# Patient Record
Sex: FEMALE
[De-identification: names, ages, dates, MRNs, and addresses within clinical notes are randomized per-mention and may not be internally consistent; named-entity substitution may affect disease eponyms.]

---

## 2017-09-25 VITALS — RESPIRATION RATE: 18 BRPM

## 2017-10-03 ENCOUNTER — HOSPITAL ENCOUNTER (OUTPATIENT)
Dept: HOSPITAL 14 - H.OPSURG | Age: 77
Discharge: HOME | End: 2017-10-03
Attending: OPHTHALMOLOGY
Payer: MEDICARE

## 2017-10-03 VITALS
HEART RATE: 61 BPM | TEMPERATURE: 97.7 F | SYSTOLIC BLOOD PRESSURE: 120 MMHG | DIASTOLIC BLOOD PRESSURE: 59 MMHG | OXYGEN SATURATION: 95 %

## 2017-10-03 VITALS — BODY MASS INDEX: 27.7 KG/M2

## 2017-10-03 DIAGNOSIS — I10: ICD-10-CM

## 2017-10-03 DIAGNOSIS — H25.041: Primary | ICD-10-CM

## 2017-10-03 DIAGNOSIS — E11.9: ICD-10-CM

## 2017-10-03 DIAGNOSIS — E03.9: ICD-10-CM

## 2017-10-03 PROCEDURE — 66984 XCAPSL CTRC RMVL W/O ECP: CPT

## 2017-10-03 PROCEDURE — 82948 REAGENT STRIP/BLOOD GLUCOSE: CPT

## 2017-10-03 RX ADMIN — SODIUM CHONDROITIN SULFATE / SODIUM HYALURONATE ONE KIT: 0.55-0.5 INJECTION INTRAOCULAR at 09:58

## 2017-10-03 RX ADMIN — SODIUM CHONDROITIN SULFATE / SODIUM HYALURONATE ONE KIT: 0.55-0.5 INJECTION INTRAOCULAR at 10:00

## 2017-10-04 NOTE — OP
PROCEDURE DATE: 10/03/2017



SURGEON: RENEE MULLEN MD



ANESTHESIOLOGIST: JESSICA ROMANO DO



ANESTHESIA: LOCAL / IV SEDATION 



PREOPERATIVE DIAGNOSIS:   CATARACT RIGHT EYE.



POSTOPERATIVE DIAGNOSIS:   CATARACT RIGHT  EYE.



OPERATION:  CLEAR CORNEAL PHACOEMULSIFICATION WITH LENS IMPLANT RIGHT EYE.



PREPARATION AND PROCEDURE:   After the patient was prepped and draped in the 
usual manner for sterile ophthalmic surgery, local IV sedation was administered
; eye seals were applied to the upper and lower lid margins and an adult wire 
lid speculum was placed within the lids. Under microsurgical control, a two-
step clear corneal incision was made into the anterior chamber. The initial 
incision was perpendicular to the corneal plane. The second 

incision with the keratome was placed at a 45-degree angle to the first 
incision. One cc of one percent Xylocaine MPF was instilled into the anterior 
chamber to achieve proper intraocular anesthesia.  At this time, the 
Viscoelastic was injected into the anterior chamber for protection of the 
endothelium and for maintenance of the chamber depth. A 360-degree continuous 
curvilinear capsulorrhexis was performed using a pre-bent 25-gauge needle. 
Hydrodissection and hydrodelineation were performed using a Walls cannula and 
balanced salt solution. Utilizing the tip of the Walls cannula, the nucleus 
was rotated freely within the capsular bag. A standard one-handed 
phacoemulsification was utilized at this time for sculpting and rotating of the 
nucleus. The nucleus was fragmented in its entirety and aspirated without any 
consequence.  A standard I&A was carried out for the residual cortical 
material. No residual material was noted within the capsular bag. The posterior 
capsule was noted to be clear. Additional Viscoelastic was injected into the 
capsular bag in preparation for lens implantation. After this has been 
satisfactorily achieved the intraocular lens injected through the corneal 
incision into the capsular bag. The intraocular lens was manipulated until it 
was properly oriented and the Viscoelastic 

was evacuated from the capsular bag and anterior chamber. The anterior chamber 
was reformed with balanced salt solution. The corneal incision was irrigated 
with BSS. The intraocular pressure was found to be within normal limits. This 
terminated the procedure. The speculum and lid drapes were removed. TobraDex 
ophthalmic suspension and Pilocarpine 1% drops one drop was applied to the eye.
  



POSTOPERATIVE CONDITION: The patient was brought to the Postanesthesia Recovery 
area with stable vital signs.





_____________________

DRENEE COLLINS MD

## 2017-10-17 ENCOUNTER — HOSPITAL ENCOUNTER (OUTPATIENT)
Dept: HOSPITAL 14 - H.OPSURG | Age: 77
Discharge: HOME | End: 2017-10-17
Attending: OPHTHALMOLOGY
Payer: MEDICARE

## 2017-10-17 VITALS — OXYGEN SATURATION: 97 %

## 2017-10-17 VITALS — TEMPERATURE: 98.2 F | HEART RATE: 75 BPM | DIASTOLIC BLOOD PRESSURE: 70 MMHG | SYSTOLIC BLOOD PRESSURE: 142 MMHG

## 2017-10-17 VITALS — RESPIRATION RATE: 18 BRPM

## 2017-10-17 VITALS — BODY MASS INDEX: 27.7 KG/M2

## 2017-10-17 DIAGNOSIS — H26.9: Primary | ICD-10-CM

## 2017-10-17 PROCEDURE — 66984 XCAPSL CTRC RMVL W/O ECP: CPT

## 2017-10-17 PROCEDURE — 82948 REAGENT STRIP/BLOOD GLUCOSE: CPT

## 2017-10-17 RX ADMIN — PILOCARPINE HYDROCHLORIDE ONE DROP: 10 SOLUTION/ DROPS OPHTHALMIC at 10:13

## 2017-10-17 RX ADMIN — TETRACAINE HYDROCHLORIDE ONE DROP: 5 SOLUTION OPHTHALMIC at 10:04

## 2017-10-17 RX ADMIN — ACETYLCHOLINE CHLORIDE ONE EA: KIT at 10:07

## 2017-10-17 RX ADMIN — NEOMYCIN SULFATE, POLYMYXIN B SULFATE AND DEXAMETHASONE ONE DROP: 3.5; 10000; 1 SUSPENSION/ DROPS OPHTHALMIC at 10:14

## 2017-10-17 RX ADMIN — TETRACAINE HYDROCHLORIDE ONE DROP: 5 SOLUTION OPHTHALMIC at 10:26

## 2017-10-17 RX ADMIN — SODIUM CHONDROITIN SULFATE / SODIUM HYALURONATE ONE KIT: 0.55-0.5 INJECTION INTRAOCULAR at 10:40

## 2017-10-17 RX ADMIN — PILOCARPINE HYDROCHLORIDE ONE DROP: 10 SOLUTION/ DROPS OPHTHALMIC at 10:50

## 2017-10-17 RX ADMIN — SODIUM CHONDROITIN SULFATE / SODIUM HYALURONATE ONE KIT: 0.55-0.5 INJECTION INTRAOCULAR at 10:07

## 2017-10-17 RX ADMIN — NEOMYCIN SULFATE, POLYMYXIN B SULFATE AND DEXAMETHASONE ONE DROP: 3.5; 10000; 1 SUSPENSION/ DROPS OPHTHALMIC at 10:50

## 2017-10-17 RX ADMIN — LIDOCAINE HYDROCHLORIDE ONE MG: 10 INJECTION, SOLUTION EPIDURAL; INFILTRATION; INTRACAUDAL; PERINEURAL at 10:39

## 2017-10-17 RX ADMIN — ACETYLCHOLINE CHLORIDE ONE EA: KIT at 10:45

## 2017-10-17 RX ADMIN — LIDOCAINE HYDROCHLORIDE ONE MG: 10 INJECTION, SOLUTION EPIDURAL; INFILTRATION; INTRACAUDAL; PERINEURAL at 10:05

## 2018-03-19 ENCOUNTER — HOSPITAL ENCOUNTER (EMERGENCY)
Dept: HOSPITAL 14 - H.ER | Age: 78
Discharge: HOME | End: 2018-03-19
Payer: MEDICARE

## 2018-03-19 VITALS — HEART RATE: 78 BPM | TEMPERATURE: 97 F | RESPIRATION RATE: 18 BRPM | OXYGEN SATURATION: 97 %

## 2018-03-19 VITALS — DIASTOLIC BLOOD PRESSURE: 72 MMHG | SYSTOLIC BLOOD PRESSURE: 161 MMHG

## 2018-03-19 VITALS — BODY MASS INDEX: 27.7 KG/M2
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
## 2018-03-19 DIAGNOSIS — Z85.850: ICD-10-CM

## 2018-03-19 DIAGNOSIS — I10: ICD-10-CM

## 2018-03-19 DIAGNOSIS — Z79.84: ICD-10-CM

## 2018-03-19 DIAGNOSIS — S89.91XA: ICD-10-CM

## 2018-03-19 DIAGNOSIS — E78.00: ICD-10-CM

## 2018-03-19 DIAGNOSIS — E03.9: ICD-10-CM
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
## 2018-03-19 DIAGNOSIS — S70.01XA: Primary | ICD-10-CM

## 2018-03-19 NOTE — RAD
PROCEDURE:  Pelvis right hip 03/19/2018



HISTORY:

Hip injury 



COMPARISON:

Comparison made with CT scan abdomen pelvis dated the 04/15/2013 

which image the pelvis and both hips in 3 planes.



TECHNIQUE:

Frontal view of the pelvis and frontal/frogleg lateral views right 

hip performed



FINDINGS:

No evidence of acute displaced fracture nor dislocation.  The osseous 

structures appear intact.  Mild degenerative changes both hip joints.



Mild sclerosis inferior margin both SI joints. 



IMPRESSION:

No definitive evidence of acute displaced fracture nor dislocation. 

DJD both hip joints.  If symptoms persist or occult fracture 

suspected clinically recommend followup CT scan or MRI.

## 2018-03-19 NOTE — RAD
PROCEDURE:  Right knee dated 03/19/2018 



HISTORY:

knee injury



COMPARISON:

Correlation made with prior radiographs left knee 07/21/2014



FINDINGS:



BONES:

No evidence of acute displaced fracture nor dislocation. The osseous 

structures appear intact. 



JOINTS:

Joint spaces relatively preserved. Tiny posterior patellar osteophyte 

formation. Knee note is made of a enthesophyte arising from the 

tibial tubercle 



JOINT EFFUSION:

Suspect small suprapatellar joint effusion could 



OTHER FINDINGS:

None.



IMPRESSION:

No acute displaced fracture nor dislocation.  Minimal DJD. Suspect 

small suprapatellar joint effusion.

## 2018-03-19 NOTE — US
PROCEDURE:  Right lower extremity venous duplex Doppler. 



HISTORY:

evaluate for dvt







COMPARISON:

None available.



TECHNIQUE:

Common femoral, superficial femoral, popliteal and posterior tibial 

veins were evaluated. Flow was assessed with color Doppler, 

compressibility, assessment of phasic flow and augmentation response. 



FINDINGS:



COMMON FEMORAL VEIN:

Unremarkable.



SUPERFICIAL FEMORAL VEIN:

Unremarkable.



POPLITEAL VEIN:

Unremarkable.



POSTERIOR TIBIAL VEIN:

Unremarkable.



OTHER FINDINGS:

None.



IMPRESSION:

No evidence of deep venous thrombosis in the right lower extremity.

## 2018-03-19 NOTE — ED PDOC
Lower Extremity Pain/Injury


Time Seen by Provider: 03/19/18 14:42


Chief Complaint (Nursing): Lower Extremity Problem/Injury


Chief Complaint (Provider): Lower Extremity Problem/Injury


History Per: Patient


History/Exam Limitations: no limitations


Onset/Duration Of Symptoms: Days (x3)


Current Symptoms Are (Timing): Still Present


Additional Complaint(s): 





77 year old female with medical history of hypertension, arthritis and thyroid 

cancer, presents to the emergency department with a complaint of right-sided 

hip and knee pain after a ladder had fallen on her 3 days ago at a [employer]. She 

denied any pain at the time and was able to ambulate but noticed swelling and 

pain today. Patient stated she took Motrin earlier today with some relief.





PMD: Satnam Marte MD





Past Medical History


Reviewed: Historical Data, Nursing Documentation, Vital Signs


Vital Signs: 





 Last Vital Signs











Temp  97 F L  03/19/18 13:36


 


Pulse  78   03/19/18 13:36


 


Resp  18   03/19/18 13:36


 


BP  161/72 H  03/19/18 13:36


 


Pulse Ox  97   03/19/18 13:36














- Medical History


PMH: Arthritis, HTN, Hypercholesterolemia, Hypothyroidism, Malignancy (Thyroid 

Ca s/p surgical resection)


   Denies: Fractures, Chronic Kidney Disease





- Surgical History


Surgical History: 


   Denies: No Surg Hx





- Family History


Family History: States: Unknown Family Hx





- Social History


Current smoker - smoking cessation education provided: No


Ex-Smoker (has not smoked in the last 12 months): Yes


Alcohol: None


Drugs: Denies





- Home Medications


Home Medications: 


 Ambulatory Orders











 Medication  Instructions  Recorded


 


Amlodipine Besylate/Benazepril 1 cap PO DAILY 10/17/17





[Amlodipine-Benazepril 5-10 mg]  


 


Ergocalciferol (Vitamin D2) 1.25 mg PO DAILY 10/17/17





[Vitamin D2]  


 


Fenofibrate [Triglide] 160 mg PO DAILY 10/17/17


 


Levothyroxine [Synthroid] 125 mcg PO DAILY 10/17/17


 


metFORMIN [glucOPHAGE] 500 mg PO DAILY 10/17/17














- Allergies


Allergies/Adverse Reactions: 


 Allergies











Allergy/AdvReac Type Severity Reaction Status Date / Time


 


No Known Allergies Allergy   Verified 03/19/18 13:36














Review of Systems


ROS Statement: Except As Marked, All Systems Reviewed And Found Negative


Genitourinary Female: Positive for: Pelvic Pain (right hip)


Musculoskeletal: Positive for: Leg Pain (right knee with swelling)





Physical Exam





- Reviewed


Nursing Documentation Reviewed: Yes


Vital Signs Reviewed: Yes





- Physical Exam


Appears: Positive for: Non-toxic, No Acute Distress


Head Exam: Positive for: ATRAUMATIC, NORMAL INSPECTION, NORMOCEPHALIC


Pulses-Dorsalis Pedis (L): 2+


Pulses-Dorsalis Pedis (R): 2+


Pulses-Post. Tibialis (L): 2+


Pulses-Post. Tibialis (R): 2+


Extremity: Positive for: Normal ROM, Tenderness (anterior right knee mildly and 

gluteal region), Swelling (right posterior aspect of popliteal fossa).  

Negative for: Deformity (lower)


Neurologic/Psych: Positive for: Alert (X3), Oriented.  Negative for: Motor/

Sensory Deficits





- ECG


O2 Sat by Pulse Oximetry: 97 (RA)


Pulse Ox Interpretation: Normal





Medical Decision Making


Medical Decision Making: 





Initial Impression: Hip pain and knee pain S/P injury





Initial Plan:


* Xray knee (right)


* Xray hip with pelvis


* US duplex LE


________________________________________________________________________________

__________________________





Time: 1646


--Xray knee (right)


FINDINGS:


BONES:


No evidence of acute displaced fracture nor dislocation. The osseous structures 

appear intact. 


JOINTS:


Joint spaces relatively preserved. Tiny posterior patellar osteophyte 

formation. Knee note is made of a enthesophyte arising from the tibial tubercle 


JOINT EFFUSION:


Suspect small suprapatellar joint effusion could 


OTHER FINDINGS:


None.





IMPRESSION:


No acute displaced fracture nor dislocation.  Minimal DJD. Suspect small 

suprapatellar joint effusion.


_________________





Time: 1649


--Xray hip/pelvis


FINDINGS:


No evidence of acute displaced fracture nor dislocation.  The osseous 

structures appear intact.  Mild degenerative changes both hip joints.


Mild sclerosis inferior margin both SI joints. 





IMPRESSION:


No definitive evidence of acute displaced fracture nor dislocation. DJD both 

hip joints.  If symptoms persist or occult fracture suspected clinically 

recommend followup CT scan or MRI.


_________________





Time: 1707


--US duplex LE


FINDINGS:


COMMON FEMORAL VEIN:


Unremarkable.


SUPERFICIAL FEMORAL VEIN:


Unremarkable.


POPLITEAL VEIN:


Unremarkable.


POSTERIOR TIBIAL VEIN:


Unremarkable.


OTHER FINDINGS:


None.





IMPRESSION:


No evidence of deep venous thrombosis in the right lower extremity.





--------------------------------------------------------------------------------

----------------------------------------


Scribe Attestation:


Documented by Rianna Hernandez, acting as a scribe for Drew Castillo PA-C.





Provider Scribe Attestation:


All medical record entries made by the Scribe were at my direction and 

personally dictated by me. I have reviewed the chart and agree that the record 

accurately reflects my personal performance of the history, physical exam, 

medical decision making, and the department course for this patient. I have 

also personally directed, reviewed, and agree with the discharge instructions 

and disposition.





Disposition





- Clinical Impression


Clinical Impression: 


 Knee injury, Contusion, hip








- Patient ED Disposition


Is Patient to be Admitted: No





- Disposition


Referrals: 


Todd Flynn III, MD [Staff Provider] - 


Disposition: Routine/Home


Disposition Time: 17:50


Condition: FAIR


Instructions:  Contusion (DC), Knee Sprain (DC), Hip Pain in Older People, Hip 

Pain


Forms:  [email or web address] (English)


Print Language: Mozambican

## 2018-06-19 ENCOUNTER — HOSPITAL ENCOUNTER (OUTPATIENT)
Dept: HOSPITAL 31 - C.SDS | Age: 78
Discharge: HOME | End: 2018-06-19
Attending: SURGERY
Payer: MEDICARE

## 2018-06-19 VITALS — SYSTOLIC BLOOD PRESSURE: 111 MMHG | DIASTOLIC BLOOD PRESSURE: 55 MMHG | HEART RATE: 66 BPM

## 2018-06-19 VITALS — RESPIRATION RATE: 18 BRPM | OXYGEN SATURATION: 98 % | TEMPERATURE: 97 F

## 2018-06-19 VITALS — BODY MASS INDEX: 30.8 KG/M2

## 2018-06-19 DIAGNOSIS — L72.3: Primary | ICD-10-CM

## 2018-06-19 PROCEDURE — 25075 EXC FOREARM LES SC < 3 CM: CPT

## 2018-06-19 PROCEDURE — 82948 REAGENT STRIP/BLOOD GLUCOSE: CPT

## 2018-06-19 PROCEDURE — 88304 TISSUE EXAM BY PATHOLOGIST: CPT

## 2018-06-19 NOTE — PCM.SURG1
Surgeon's Initial Post Op Note





- Surgeon's Notes


Surgeon: Dr. Rivera


Assistant: Oliva Solis PGy2


Pre-Operative Diagnosis: L arm cyst


Operative Findings: L arm cyst [id number]


Post-Operative Diagnosis: SAme


Operation Performed: Excision of L arm cyst


Specimen/Specimens Removed: L arm cyst [id number]


Estimated Blood Loss: EBL {In ML}: 5


Blood Products Given: N/A


Drains Used: No Drains


Post-Op Condition: Good


Date of Surgery/Procedure: 06/19/18


Time of Surgery/Procedure: 11:51

## 2018-06-22 NOTE — OP
PROCEDURE DATE:  06/19/2018



PREOPERATIVE DIAGNOSIS:  Cyst, left forearm.



POSTOPERATIVE DIAGNOSIS:  Cyst, left forearm.



PROCEDURE:  Excision of soft tissue mass, left forearm.



SURGEON:  Dr. Samira Rivera.



ASSISTANT:  Dr. Solis.



ANESTHESIA:  Local with IV sedation.



ANESTHESIOLOGIST:  Conchita Vidal CRNA.



DESCRIPTION OF OPERATION:  With the patient in the supine position having

received IV sedation, the left arm was positioned across the patient's body

to access a dorsal forearm lesion.  The left forearm was prepped and draped

in the usual sterile manner.  There was noted to be a 1 cm subcutaneous

firm mass at several inches below the elbow, and the skin surrounding this

was infiltrated with 1% lidocaine.  An elliptical incision was made in an

axial direction and the mass which appeared to have the gross appearance of

an inspissated inclusion cyst or a _____ cyst was sharply dissected from

the surrounding subcutaneous tissue.  The operative site was examined for

hemostasis and closure was closed with subcuticular sutures of 4-0 Monocryl

and Steri-Strips.  A dry sterile dressing was applied.  The patient

tolerated the procedure well and transferred back to day stay in stable

condition.  Estimated blood loss for the procedure was 5 mL.







__________________________________________

Samira Rivera MD





DD:  06/21/2018 15:11:16

DT:  06/21/2018 22:33:40

Job # [id number]

## 2019-04-27 ENCOUNTER — HOSPITAL ENCOUNTER (EMERGENCY)
Dept: HOSPITAL 14 - H.ER | Age: 79
LOS: 1 days | Discharge: HOME | End: 2019-04-28
Payer: MEDICARE

## 2019-04-27 VITALS — BODY MASS INDEX: 26.9 KG/M2

## 2019-04-27 DIAGNOSIS — Z85.850: ICD-10-CM

## 2019-04-27 DIAGNOSIS — Z79.84: ICD-10-CM

## 2019-04-27 DIAGNOSIS — Z98.890: ICD-10-CM

## 2019-04-27 DIAGNOSIS — R09.89: Primary | ICD-10-CM

## 2019-04-27 DIAGNOSIS — J06.9: ICD-10-CM

## 2019-04-27 DIAGNOSIS — J44.9: ICD-10-CM

## 2019-04-27 DIAGNOSIS — M81.0: ICD-10-CM

## 2019-04-27 DIAGNOSIS — J30.2: ICD-10-CM

## 2019-04-27 DIAGNOSIS — I10: ICD-10-CM

## 2019-04-27 DIAGNOSIS — Z79.899: ICD-10-CM

## 2019-04-27 DIAGNOSIS — E03.9: ICD-10-CM

## 2019-04-27 LAB
ALBUMIN SERPL-MCNC: 4.1 G/DL (ref 3.5–5)
ALBUMIN/GLOB SERPL: 1 {RATIO} (ref 1–2.1)
ALT SERPL-CCNC: 19 U/L (ref 9–52)
APTT BLD: 31.2 SECONDS (ref 25.6–37.1)
AST SERPL-CCNC: 26 U/L (ref 14–36)
BASE EXCESS BLDV CALC-SCNC: 3.6 MMOL/L (ref 0–2)
BASOPHILS # BLD AUTO: 0 K/UL (ref 0–0.2)
BASOPHILS NFR BLD: 0.8 % (ref 0–2)
BNP SERPL-MCNC: 69 PG/ML (ref 0–900)
BUN SERPL-MCNC: 23 MG/DL (ref 7–17)
CALCIUM SERPL-MCNC: 8.9 MG/DL (ref 8.4–10.2)
EOSINOPHIL # BLD AUTO: 0.1 K/UL (ref 0–0.7)
EOSINOPHIL NFR BLD: 2.5 % (ref 0–4)
ERYTHROCYTE [DISTWIDTH] IN BLOOD BY AUTOMATED COUNT: 13.9 % (ref 11.5–14.5)
GFR NON-AFRICAN AMERICAN: > 60
HGB BLD-MCNC: 11.5 G/DL (ref 12–16)
INR PPP: 1.1
LYMPHOCYTES # BLD AUTO: 1.3 K/UL (ref 1–4.3)
LYMPHOCYTES NFR BLD AUTO: 26.6 % (ref 20–40)
MCH RBC QN AUTO: 27.6 PG (ref 27–31)
MCHC RBC AUTO-ENTMCNC: 32.4 G/DL (ref 33–37)
MCV RBC AUTO: 85.2 FL (ref 81–99)
MONOCYTES # BLD: 0.4 K/UL (ref 0–0.8)
MONOCYTES NFR BLD: 7 % (ref 0–10)
NEUTROPHILS # BLD: 3.2 K/UL (ref 1.8–7)
NEUTROPHILS NFR BLD AUTO: 63.1 % (ref 50–75)
NRBC BLD AUTO-RTO: 0.1 % (ref 0–0)
PCO2 BLDV: 59 MMHG (ref 40–60)
PH BLDV: 7.33 [PH] (ref 7.32–7.43)
PLATELET # BLD: 365 K/UL (ref 130–400)
PMV BLD AUTO: 8.8 FL (ref 7.2–11.7)
PROTHROMBIN TIME: 12.1 SECONDS (ref 9.8–13.1)
RBC # BLD AUTO: 4.16 MIL/UL (ref 3.8–5.2)
VENOUS BLOOD FIO2: 21 %
VENOUS BLOOD GAS PO2: 30 MM/HG (ref 30–55)
WBC # BLD AUTO: 5 K/UL (ref 4.8–10.8)

## 2019-04-27 PROCEDURE — 99285 EMERGENCY DEPT VISIT HI MDM: CPT

## 2019-04-27 PROCEDURE — 87040 BLOOD CULTURE FOR BACTERIA: CPT

## 2019-04-27 PROCEDURE — 83735 ASSAY OF MAGNESIUM: CPT

## 2019-04-27 PROCEDURE — 85610 PROTHROMBIN TIME: CPT

## 2019-04-27 PROCEDURE — 94640 AIRWAY INHALATION TREATMENT: CPT

## 2019-04-27 PROCEDURE — 80053 COMPREHEN METABOLIC PANEL: CPT

## 2019-04-27 PROCEDURE — 93005 ELECTROCARDIOGRAM TRACING: CPT

## 2019-04-27 PROCEDURE — 83880 ASSAY OF NATRIURETIC PEPTIDE: CPT

## 2019-04-27 PROCEDURE — 71045 X-RAY EXAM CHEST 1 VIEW: CPT

## 2019-04-27 PROCEDURE — 85730 THROMBOPLASTIN TIME PARTIAL: CPT

## 2019-04-27 PROCEDURE — 84100 ASSAY OF PHOSPHORUS: CPT

## 2019-04-27 PROCEDURE — 82803 BLOOD GASES ANY COMBINATION: CPT

## 2019-04-27 PROCEDURE — 84484 ASSAY OF TROPONIN QUANT: CPT

## 2019-04-27 PROCEDURE — 85025 COMPLETE CBC W/AUTO DIFF WBC: CPT

## 2019-04-27 PROCEDURE — 96374 THER/PROPH/DIAG INJ IV PUSH: CPT

## 2019-04-27 PROCEDURE — 94150 VITAL CAPACITY TEST: CPT

## 2019-04-27 NOTE — ED PDOC
HPI: CCC, URI, Sore Throat


Time Seen by Provider: 04/27/19 21:29


Chief Complaint (Nursing): Chest Pain


Chief Complaint (Provider): Cough


History Per: Patient


History/Exam Limitations: no limitations


Onset/Duration Of Symptoms: Days (x3)


Current Symptoms Are (Timing): Still Present


Associated Symptoms: Cough.  denies: Fever, Nausea, Vomiting, Diarrhea


Additional Complaint(s): 





78 year old female presents to the ED with 3 days of worsening cough. Patient 

reports associated chest pain when coughing. She denies any fever, nausea, 

vomiting, diarrhea or allergies. 





PMD: none provided





Past Medical History


Reviewed: Historical Data, Nursing Documentation, Vital Signs





- Medical History


PMH: Arthritis, Asthma, HTN, Hypercholesterolemia, Hypothyroidism (S/P THYROID C

ANCER), Malignancy (Thyroid Ca s/p surgical resection), Osteoporosis


   Denies: Fractures, Chronic Kidney Disease


   Comment Only: COPD (??)





- Surgical History


Surgical History: Endoscopy





- Family History


Family History: States: Unknown Family Hx





- Home Medications


Home Medications: 


                                Ambulatory Orders











 Medication  Instructions  Recorded


 


Amlodipine Besylate/Benazepril 1 cap PO DAILY 10/17/17





[Amlodipine-Benazepril 5-10 mg]  


 


Ergocalciferol (Vitamin D2) 2,000 mg PO DAILY 10/17/17





[Vitamin D2]  


 


Fenofibrate [Triglide] 160 mg PO DAILY 10/17/17


 


metFORMIN [glucOPHAGE] 500 mg PO DAILY 10/17/17


 


Levothyroxine [Synthroid] 1 tab PO DAILY 06/13/18


 


Azithromycin [Z-Juanpablo] 250 mg PO DAILY #6 tab 04/28/19


 


Cetirizine HCl [Zyrtec] 10 mg PO DAILY #30 capsule 04/28/19


 


predniSONE [predniSONE Tab] 40 mg PO DAILY #8 tab 04/28/19














- Allergies


Allergies/Adverse Reactions: 


                                    Allergies











Allergy/AdvReac Type Severity Reaction Status Date / Time


 


No Known Allergies Allergy   Verified 04/27/19 21:28














Review of Systems


ROS Statement: Except As Marked, All Systems Reviewed And Found Negative


Constitutional: Negative for: Fever


Cardiovascular: Positive for: Chest Pain (with cough)


Respiratory: Positive for: Cough


Gastrointestinal: Negative for: Nausea, Vomiting, Diarrhea





Physical Exam





- Reviewed


Nursing Documentation Reviewed: Yes


Vital Signs Reviewed: Yes





- Physical Exam


Appears: Positive for: Uncomfortable


Skin: Positive for: Normal Color, Warm


Eye Exam: Positive for: Normal appearance


Cardiovascular/Chest: Positive for: Regular Rate, Rhythm


Respiratory: Positive for: Wheezing (faint bilaterally)


Extremity: Positive for: Normal ROM (upper and lower)


Neurological/Psych: Positive for: Awake, Alert, Oriented (x3)





- Laboratory Results


Result Diagrams: 


                                 04/27/19 22:10





                                 04/27/19 22:10





Medical Decision Making


Medical Decision Making: 





Time: 2129


Workup for pneumonia vs bronchitis, Rue lout cardiac etiology


Plan:


--labs


--CXR


--Solu-medrol


--Duonebs


--reassess patient





 


Labs show no abnormalities.  CXR shows no signs of pneumonia or other 

abnormalities.  Pt states she is feeling better after steroids and nebulizer 

treatments.  Pt to be discharged home with prednisone and z-pack.  Pt will 

follow up with PMD in one week and return parameters were discussed.





------------------------------------------------------------

-------------------------------------


ScribeAttestation:


Documented byJessica Palafox, acting as a scribe for Naina Ribeiro MD.





Provider ScribeAttestation:


All medical record entries made by the Scribe were at my direction and 

personally dictated by me. I have reviewed the chart and agree that the record 

accurately reflects my personal performance of the history, physical exam, 

medical decision making, and the department course for this patient. I have also

personally directed, reviewed, and agree with the discharge instructions and 

disposition.





Disposition





- Clinical Impression


Clinical Impression: 


 Chest congestion, Seasonal allergies, Upper respiratory infection








- Disposition


Disposition: Routine/Home


Disposition Time: 00:40


Condition: IMPROVED


Additional Instructions: 


Take medications as prescribed.  Follow up with primary medical doctor for 

allergy workup.  Return to the emergency department if symptoms worsen or if new

symptoms develop.


Prescriptions: 


Azithromycin [Z-Juanpablo] 250 mg PO DAILY #6 tab


Cetirizine HCl [Zyrtec] 10 mg PO DAILY #30 capsule


predniSONE [predniSONE Tab] 40 mg PO DAILY #8 tab


Instructions:  Seasonal Allergies (DC)


Forms:  [employer] (English), [employer] (Vietnamese)


Print Language: Urdu

## 2019-04-28 VITALS
DIASTOLIC BLOOD PRESSURE: 80 MMHG | TEMPERATURE: 98.6 F | HEART RATE: 83 BPM | OXYGEN SATURATION: 96 % | SYSTOLIC BLOOD PRESSURE: 152 MMHG | RESPIRATION RATE: 17 BRPM

## 2019-04-28 NOTE — CARD
--------------- APPROVED REPORT --------------





Date of service: 04/27/2019



EKG Measurement

Heart [id number]

NC [id number]

[id number]

[id number]

[id number]



<Conclusion>

Normal sinus rhythm with sinus arrhythmia

Left atrial enlargement

Incomplete right bundle branch block

Non specific T-wave changes

Prolonged QT

Abnormal ECG

## 2019-04-28 NOTE — RAD
Date of service: 



04/27/2019



HISTORY:

 possible admission 



COMPARISON:

Comparison made with prior chest radiograph dated 09/25/2017. 



TECHNIQUE:

1 view obtained.



FINDINGS:



LUNGS:

Low lung volumes with crowded bronchovascular markings and minor 

bibasilar atelectasis



PLEURA:

No significant pleural effusion identified, no pneumothorax apparent.



CARDIOVASCULAR:

Mild aortic atherosclerotic calcification present.



Borderline/mild cardiomegaly.  No pulmonary vascular congestion. 



OSSEOUS STRUCTURES:

No significant abnormalities.



VISUALIZED UPPER ABDOMEN:

Normal.



OTHER FINDINGS:

None.



IMPRESSION:

Low lung volumes with crowded bronchovascular markings and minor 

bibasilar atelectasis